# Patient Record
(demographics unavailable — no encounter records)

---

## 2024-10-15 NOTE — HISTORY OF PRESENT ILLNESS
[de-identified] : 43 year old male presents with acute exacerbation of chronic low back pain.  He denies radicular pain, recent illness, fevers, numbness, weakness, balance problems, saddle anesthesia, urinary retention or fecal incontinence.  He has done PT in the past with some improvement.  Since previous visit, patient's lower back pain has improved. He only has pain with prolonged sitting and carrying a heavy backpack. He is here to review his MRI.

## 2024-10-15 NOTE — ASSESSMENT
[FreeTextEntry1] : 43 year old male presents with acute exacerbation of chronic low back pain.  He denies radicular pain, recent illness, fevers, numbness, weakness, balance problems, saddle anesthesia, urinary retention or fecal incontinence.  He has done PT in the past with some improvement.  Since previous visit, patient's lower back pain has improved. He only has pain with prolonged sitting and carrying a heavy backpack. He is here to review his MRI. The patient was given a referral for physical therapy. F/U in 4-6 weeks. We discussed red flag symptoms that would require emergent evaluation.  He knows to call with any questions or concerns or if his symptoms acutely worsen.

## 2024-10-15 NOTE — DISCUSSION/SUMMARY
[de-identified] :  I, Dr. Kurtz personally performed the evaluation and management services for this established patient who presents today with (a) new problem(s)/exacerbation of (an) existing condition(s). That E/M includes conducting the clinically appropriate interval history &/or exam, assessing all new/exacerbated conditions, and establishing a new plan of care. Today, my RAYNA, Amisha Abrams was here to observe my evaluation and management service for this new problem/exacerbated condition and follow the plan of care established by me going forward.  
4

## 2024-10-15 NOTE — PHYSICAL EXAM
Applied [de-identified] : General: No acute distress, conversant, well-nourished. Head: Normocephalic, atraumatic Neck: trachea midline, FROM Heart: normotensive and normal rate and rhythm Lungs: No labored breathing Skin: No abrasions, no rashes, no edema Psych: Alert and oriented to person, place and time Extremities: no peripheral edema or digital cyanosis Gait: Normal gait. Can perform tandem gait.   Vascular: warm and well perfused distally, palpable distal pulses MSK: Lumbar spine: No tenderness to palpation.  No step-off, no deformity.  NEURO EXAM: Sensation  Left L2  -  2/2             Left L3  -  2/2 Left L4  -  2/2 Left L5  -  2/2 Left S1  -  2/2  Right L2  -  2/2             Right L3  -  2/2 Right L4  -  2/2 Right L5  -  2/2 Right S1  -  2/2  Motor:  Left L2 (hip flexion)                            5/5                 Left L3 (knee extension)                   5/5                 Left L4 (ankle dorsiflexion)                 5/5                 Left L5 (long toe extensor)                5/5                 Left S1 (ankle plantar flexion)           5/5  Right L2 (hip flexion)                            5/5                 Right L3 (knee extension)                   5/5                 Right L4 (ankle dorsiflexion)                 5/5                 Right L5 (long toe extensor)                5/5                 Right S1 (ankle plantar flexion)           5/5  Reflexes: Normal and symmetric Negative clonus.  Down-going Babinski.    [de-identified] : I independently reviewed lumbar MRI which shows degenerative changes without compression of neural elements.   MRI of the lumbar spine 10/07/2024  Multilevel degenerative changes of the lumbar spine, as detailed above.

## 2024-11-26 NOTE — REASON FOR VISIT
[Follow-Up Visit] : a follow-up visit for [Back Pain] : back pain [FreeTextEntry2] : experiencing soreness, Started physical therapy

## 2024-11-26 NOTE — HISTORY OF PRESENT ILLNESS
[de-identified] : 44 year old male presents with acute exacerbation of chronic low back pain. He denies radicular pain, recent illness, fevers, numbness, weakness, balance problems, saddle anesthesia, urinary retention or fecal incontinence. Since previous visit, patient continues to notice improvement with PT. He started going to a chiropractor with good relief.

## 2024-11-26 NOTE — ASSESSMENT
[FreeTextEntry1] : 44 year old male presents with acute exacerbation of chronic low back pain. He denies radicular pain, recent illness, fevers, numbness, weakness, balance problems, saddle anesthesia, urinary retention or fecal incontinence. Since previous visit, patient continues to notice improvement with PT. He started going to a chiropractor with good relief. The patient was given a referral for physical therapy. Continue PT. Discussed transition to HEP as tolerated. Continue chiropractic care. F/U PRN. We discussed red flag symptoms that would require emergent evaluation.  He knows to call with any questions or concerns or if his symptoms acutely worsen.

## 2024-11-26 NOTE — DISCUSSION/SUMMARY
[de-identified] :  I, Dr. Kurtz personally performed the evaluation and management services for this established patient who presents today with (a) new problem(s)/exacerbation of (an) existing condition(s). That E/M includes conducting the clinically appropriate interval history &/or exam, assessing all new/exacerbated conditions, and establishing a new plan of care. Today, my RAYNA, Amisha Abrams was here to observe my evaluation and management service for this new problem/exacerbated condition and follow the plan of care established by me going forward.

## 2024-11-26 NOTE — PHYSICAL EXAM
[de-identified] : General: No acute distress, conversant, well-nourished. Head: Normocephalic, atraumatic Neck: trachea midline, FROM Heart: normotensive and normal rate and rhythm Lungs: No labored breathing Skin: No abrasions, no rashes, no edema Psych: Alert and oriented to person, place and time Extremities: no peripheral edema or digital cyanosis Gait: Normal gait. Can perform tandem gait. Vascular: warm and well perfused distally, palpable distal pulses MSK: Lumbar spine: No tenderness to palpation. No step-off, no deformity.  NEURO EXAM: Sensation Left L2 - 2/2  Left L3 - 2/2 Left L4 - 2/2 Left L5 - 2/2 Left S1 - 2/2  Right L2 - 2/2  Right L3 - 2/2 Right L4 - 2/2 Right L5 - 2/2 Right S1 - 2/2  Motor: Left L2 (hip flexion)    5/5  Left L3 (knee extension)   5/5  Left L4 (ankle dorsiflexion)   5/5  Left L5 (long toe extensor)  5/5  Left S1 (ankle plantar flexion)  5/5  Right L2 (hip flexion)    5/5  Right L3 (knee extension)   5/5  Right L4 (ankle dorsiflexion)   5/5  Right L5 (long toe extensor)  5/5  Right S1 (ankle plantar flexion)  5/5  Reflexes: Normal and symmetric Negative clonus. Down-going Babinski.    Imaging: I independently reviewed lumbar MRI which shows degenerative changes without compression of neural elements.

## 2025-01-24 NOTE — HISTORY OF PRESENT ILLNESS
[de-identified] : 44 year old male followup with acute exacerbation of chronic low back pain.  He denies radicular pain, recent illness, fevers, numbness, weakness, balance problems, saddle anesthesia, urinary retention or fecal incontinence.  He reports great improvement with PT since his last visit.

## 2025-01-24 NOTE — ASSESSMENT
[FreeTextEntry1] : 44 year old male followup with acute exacerbation of chronic low back pain.  He denies radicular pain, recent illness, fevers, numbness, weakness, balance problems, saddle anesthesia, urinary retention or fecal incontinence.  He reports great improvement with PT since his last visit.  Continue PT. The patient was given a referral for physical therapy.  We discussed gradual return to workouts in the gym.  Diclofenac prn. F/U in 2 months. We discussed red flag symptoms that would require emergent evaluation. He knows to call with any questions or concerns or if his symptoms acutely worsen.

## 2025-01-24 NOTE — PHYSICAL EXAM
[de-identified] : General: No acute distress, conversant, well-nourished. Head: Normocephalic, atraumatic Neck: trachea midline, FROM Heart: normotensive and normal rate and rhythm Lungs: No labored breathing Skin: No abrasions, no rashes, no edema Psych: Alert and oriented to person, place and time Extremities: no peripheral edema or digital cyanosis Gait: Normal gait. Can perform tandem gait.   Vascular: warm and well perfused distally, palpable distal pulses MSK: Lumbar spine: No tenderness to palpation.  No step-off, no deformity.  NEURO EXAM: Sensation  Left L2  -  2/2             Left L3  -  2/2 Left L4  -  2/2 Left L5  -  2/2 Left S1  -  2/2  Right L2  -  2/2             Right L3  -  2/2 Right L4  -  2/2 Right L5  -  2/2 Right S1  -  2/2  Motor:  Left L2 (hip flexion)                            5/5                 Left L3 (knee extension)                   5/5                 Left L4 (ankle dorsiflexion)                 5/5                 Left L5 (long toe extensor)                5/5                 Left S1 (ankle plantar flexion)           5/5  Right L2 (hip flexion)                            5/5                 Right L3 (knee extension)                   5/5                 Right L4 (ankle dorsiflexion)                 5/5                 Right L5 (long toe extensor)                5/5                 Right S1 (ankle plantar flexion)           5/5  Reflexes: Normal and symmetric Negative clonus.  Down-going Babinski.    [de-identified] : I independently reviewed lumbar MRI which shows degenerative changes  MRI of the lumbar spine 10/07/2024

## 2025-01-24 NOTE — REASON FOR VISIT
[Follow-Up Visit] : a follow-up visit for [Back Pain] : back pain [FreeTextEntry2] : pain scale 2/10 , no symptoms/ pt is still in physical therapy